# Patient Record
Sex: FEMALE | Race: BLACK OR AFRICAN AMERICAN
[De-identification: names, ages, dates, MRNs, and addresses within clinical notes are randomized per-mention and may not be internally consistent; named-entity substitution may affect disease eponyms.]

---

## 2018-11-05 ENCOUNTER — HOSPITAL ENCOUNTER (EMERGENCY)
Dept: HOSPITAL 62 - ER | Age: 58
Discharge: HOME | End: 2018-11-05
Payer: COMMERCIAL

## 2018-11-05 VITALS — SYSTOLIC BLOOD PRESSURE: 112 MMHG | DIASTOLIC BLOOD PRESSURE: 60 MMHG

## 2018-11-05 DIAGNOSIS — R47.81: ICD-10-CM

## 2018-11-05 DIAGNOSIS — E10.65: Primary | ICD-10-CM

## 2018-11-05 DIAGNOSIS — R41.82: ICD-10-CM

## 2018-11-05 DIAGNOSIS — M62.81: ICD-10-CM

## 2018-11-05 LAB
ADD MANUAL DIFF: NO
ALBUMIN SERPL-MCNC: 4.3 G/DL (ref 3.5–5)
ALP SERPL-CCNC: 78 U/L (ref 38–126)
ALT SERPL-CCNC: 22 U/L (ref 9–52)
ANION GAP SERPL CALC-SCNC: 11 MMOL/L (ref 5–19)
APPEARANCE UR: (no result)
APTT PPP: (no result) S
AST SERPL-CCNC: 31 U/L (ref 14–36)
BASOPHILS # BLD AUTO: 0 10^3/UL (ref 0–0.2)
BASOPHILS NFR BLD AUTO: 0.5 % (ref 0–2)
BILIRUB DIRECT SERPL-MCNC: 0.1 MG/DL (ref 0–0.4)
BILIRUB SERPL-MCNC: 1.2 MG/DL (ref 0.2–1.3)
BILIRUB UR QL STRIP: NEGATIVE
BUN SERPL-MCNC: 9 MG/DL (ref 7–20)
CALCIUM: 9.9 MG/DL (ref 8.4–10.2)
CHLORIDE SERPL-SCNC: 104 MMOL/L (ref 98–107)
CO2 SERPL-SCNC: 29 MMOL/L (ref 22–30)
EOSINOPHIL # BLD AUTO: 0 10^3/UL (ref 0–0.6)
EOSINOPHIL NFR BLD AUTO: 0.6 % (ref 0–6)
ERYTHROCYTE [DISTWIDTH] IN BLOOD BY AUTOMATED COUNT: 13.5 % (ref 11.5–14)
GLUCOSE SERPL-MCNC: 108 MG/DL (ref 75–110)
GLUCOSE UR STRIP-MCNC: NEGATIVE MG/DL
HCT VFR BLD CALC: 37.2 % (ref 36–47)
HGB BLD-MCNC: 13 G/DL (ref 12–15.5)
KETONES UR STRIP-MCNC: NEGATIVE MG/DL
LYMPHOCYTES # BLD AUTO: 1.5 10^3/UL (ref 0.5–4.7)
LYMPHOCYTES NFR BLD AUTO: 27.3 % (ref 13–45)
MCH RBC QN AUTO: 33.2 PG (ref 27–33.4)
MCHC RBC AUTO-ENTMCNC: 35 G/DL (ref 32–36)
MCV RBC AUTO: 95 FL (ref 80–97)
MONOCYTES # BLD AUTO: 0.3 10^3/UL (ref 0.1–1.4)
MONOCYTES NFR BLD AUTO: 5.9 % (ref 3–13)
NEUTROPHILS # BLD AUTO: 3.6 10^3/UL (ref 1.7–8.2)
NEUTS SEG NFR BLD AUTO: 65.7 % (ref 42–78)
NITRITE UR QL STRIP: NEGATIVE
PH UR STRIP: 7 [PH] (ref 5–9)
PLATELET # BLD: 201 10^3/UL (ref 150–450)
POTASSIUM SERPL-SCNC: 4.6 MMOL/L (ref 3.6–5)
PROT SERPL-MCNC: 7.3 G/DL (ref 6.3–8.2)
PROT UR STRIP-MCNC: NEGATIVE MG/DL
RBC # BLD AUTO: 3.91 10^6/UL (ref 3.72–5.28)
SODIUM SERPL-SCNC: 143.9 MMOL/L (ref 137–145)
SP GR UR STRIP: 1
TOTAL CELLS COUNTED % (AUTO): 100 %
UROBILINOGEN UR-MCNC: NEGATIVE MG/DL (ref ?–2)
WBC # BLD AUTO: 5.5 10^3/UL (ref 4–10.5)

## 2018-11-05 PROCEDURE — 80053 COMPREHEN METABOLIC PANEL: CPT

## 2018-11-05 PROCEDURE — 81001 URINALYSIS AUTO W/SCOPE: CPT

## 2018-11-05 PROCEDURE — 84484 ASSAY OF TROPONIN QUANT: CPT

## 2018-11-05 PROCEDURE — 93005 ELECTROCARDIOGRAM TRACING: CPT

## 2018-11-05 PROCEDURE — 99284 EMERGENCY DEPT VISIT MOD MDM: CPT

## 2018-11-05 PROCEDURE — 85025 COMPLETE CBC W/AUTO DIFF WBC: CPT

## 2018-11-05 PROCEDURE — 36415 COLL VENOUS BLD VENIPUNCTURE: CPT

## 2018-11-05 PROCEDURE — 82962 GLUCOSE BLOOD TEST: CPT

## 2018-11-05 PROCEDURE — 93010 ELECTROCARDIOGRAM REPORT: CPT

## 2018-11-05 NOTE — ER DOCUMENT REPORT
ED Blood Sugar Problem





- General


Mode of Arrival: Ambulatory


Information source: Patient, Relative


TRAVEL OUTSIDE OF THE [place] IN LAST 30 DAYS: No





<REYES GARCIA - Last Filed: 11/05/18 12:55>





<FATEMEH RANDOLPH - Last Filed: 11/05/18 14:05>





- General


Chief Complaint: Low Blood Sugar


Stated Complaint: WEAKNESS,SLURRED SPEECH


Time Seen by Provider: 11/05/18 11:15


Notes: 





57-year-old female that presents to the emergency department today with 

complaints of waking up with slurred speech, right upper and right lower 

extremity weakness.  Patient states this is happened to her in the past when 

she had low blood sugars.  Family states they gave the patient glucose just 

prior to coming here. Patient was found to have a blood sugar of 77 here on 

arrival.  After getting this glucose, patient states her symptoms completely 

resolved shortly after arriving here. (REYES GARCIA)





- Related Data


Allergies/Adverse Reactions: 


 





No Known Allergies Allergy (Verified 11/05/18 11:04)


 











Past Medical History





- General


Information source: Patient





- Social History


Smoking Status: Never Smoker


Frequency of alcohol use: None


Drug Abuse: None


Lives with: Spouse/Significant other


Family History: Reviewed & Not Pertinent


Patient has suicidal ideation: No


Patient has homicidal ideation: No


Endocrine Medical History: Reports: Hx Diabetes Mellitus Type 1


Past Surgical History: Reports: Hx Hysterectomy





<REYES GARCIA - Last Filed: 11/05/18 12:55>





Review of Systems





- Review of Systems


Constitutional: See HPI, Other - Low BGL


EENT: No symptoms reported


Cardiovascular: No symptoms reported


Respiratory: No symptoms reported


Gastrointestinal: No symptoms reported


Genitourinary: No symptoms reported


Female Genitourinary: No symptoms reported


Musculoskeletal: No symptoms reported


Skin: No symptoms reported


Hematologic/Lymphatic: No symptoms reported


Neurological/Psychological: See HPI, Weakness - RUE/RLE, resolved, Speech 

impairment - resolved


-: Yes All other systems reviewed and negative





<REYES GARCIA - Last Filed: 11/05/18 12:55>





Physical Exam





<REYES GARCIA - Last Filed: 11/05/18 12:55>





<FATEMEH RANDOLPH - Last Filed: 11/05/18 14:05>





- Vital signs


Vitals: 


 











Temp Pulse Resp BP Pulse Ox


 


 98.2 F   71   16   118/54 L  99 


 


 11/05/18 11:06  11/05/18 11:06  11/05/18 11:06  11/05/18 11:06  11/05/18 11:06














- Notes


Notes: 





Physical Exam:


 


General: Alert, appears well. 


 


HEENT: Normocephalic. Atraumatic. PERRL. Extraocular movements intact. 

Oropharynx clear.


 


Neck: Supple. Non-tender.


 


Respiratory: No respiratory distress. Clear and equal breath sounds bilaterally.


 


Cardiovascular: Regular rate and rhythm. 


 


Abdominal: Normal Inspection. Non-tender. No distension. Normal Bowel Sounds. 


 


Back: Non-tender. No deformity or step off.


 


Extremities: Moves all four extremities.


Upper extremities: Normal inspection. Normal ROM.  


Lower extremities: Normal inspection. No edema. Normal ROM.


 


Neurological: Normal cognition. AAOx4. Normal speech.  


 


Psychological: Normal affect. Normal Mood. 


 


Skin: Warm. Dry. Normal color. (REYES GARCIA)





Course





- Laboratory


Result Diagrams: 


 11/05/18 11:35





 11/05/18 11:35





<REYES GARCIA - Last Filed: 11/05/18 12:55>





- Laboratory


Result Diagrams: 


 11/05/18 11:35





 11/05/18 11:35





<FATEMEH RANDOLPH - Last Filed: 11/05/18 14:05>





- Vital Signs


Vital signs: 


 











Temp Pulse Resp BP Pulse Ox


 


 98.2 F   71   16   118/54 L  100 


 


 11/05/18 11:06  11/05/18 11:06  11/05/18 11:06  11/05/18 11:06  11/05/18 11:42














- Laboratory


Laboratory results interpreted by me: 


 











  11/05/18





  13:00


 


Ur Leukocyte Esterase  SMALL H














Discharge





<REYES GARCIA - Last Filed: 11/05/18 12:55>





<FATEMEH RANDOLPH - Last Filed: 11/05/18 14:05>





- Discharge


Clinical Impression: 


 Hypoglycemia





Altered mental status


Qualifiers:


 Altered mental status type: unspecified Qualified Code(s): R41.82 - Altered 

mental status, unspecified





Condition: Stable


Disposition: HOME, SELF-CARE


Additional Instructions: 


Hypoglycemia





     You have suffered an episode of hypoglycemia (low blood sugar). Typical 

symptoms of hypoglycemia are shaking, sweating, headache, and confusion.  When 

severe, unconsciousness or seizure may occur. Hypoglycemia occurs when a person 

taking insulin or diabetes pills has a change in the amount of blood sugar 

available -- due to exercise, decreased food intake, or alcohol.


     Should you feel symptoms of hypoglycemia again, immediately take some form 

of sugar such as sweetened juice.  As the reaction subsides, eat a complex 

carbohydrate such as bread.  If possible, check your blood sugar using a 

chemical strip.


     If episodes are occurring without obvious explanation, contact your 

physician for further evaluation.








********************************************************************************

*************





Be sure to check your sugars frequently.


Reduce your insulin dosing if your blood sugars are running low.


Be sure not to miss any meals.


Follow-up with your doctor tomorrow to discuss changes to your insulin dosing.





RETURN TO THE EMERGENCY ROOM IF ANY NEW OR WORSENING SYMPTOMS.








Scribe Attestation: 





11/05/18 12:19


I personally performed the services described in the documentation, reviewed 

and edited the documentation which was dictated to the scribe in my presence, 

and it accurately records my words and actions. (FATEMEH RANDOLPH)

## 2018-11-05 NOTE — EKG REPORT
SEVERITY:- OTHERWISE NORMAL ECG -

SINUS RHYTHM

LEFT AXIS DEVIATION

LOW VOLTAGE IN FRONTAL LEADS

:

Confirmed by: Jabier Charles MD 05-Nov-2018 13:18:49

## 2018-11-05 NOTE — ER DOCUMENT REPORT
ED Medical Screen (RME)





- General


Chief Complaint: Low Blood Sugar


Stated Complaint: WEAKNESS,SLURRED SPEECH


Time Seen by Provider: 11/05/18 11:15


Mode of Arrival: Ambulatory


Information source: Patient


Notes: 





57-year-old female presents emergency department complaints of slurred speech, 

right upper extremity, right lower extremity weakness.  Patient states that she 

woke up with the symptoms.  Patient has a history of diabetes and CVA.  She 

states that she has been checking her blood sugar and it has been running on 

the lower side.  In the emergency department her blood sugar 77.  Patient 

states that she does have a history of CVA with similar symptoms in the past.  

She states that her hypoglycemic episodes can also mimic her stroke symptoms.  

Patient states that her symptoms are not resolving and she is concerned she is 

having another stroke.





I have greeted and performed a rapid initial assessment of this patient.  A 

comprehensive ED assessment and evaluation of the patient, analysis of test 

results and completion of the medical decision making process will be conducted 

by additional ED providers.





PHYSICAL EXAMINATION:





GENERAL: Well-appearing, well-nourished and in no acute distress.





HEAD: Atraumatic, normocephalic.





EYES: Pupils equal round extraocular movements intact,  conjunctiva are normal.





ENT: Nares patent





NECK: Normal range of motion





LUNGS: No respiratory distress





Musculoskeletal: Normal range of motion





NEUROLOGICAL:  Normal speech, Normal finger to nose. No facial droop. No 

weakness appreciated. 





PSYCH: Normal mood, normal affect.





SKIN: Warm, Dry, normal turgor, no rashes or lesions noted.


TRAVEL OUTSIDE OF THE [place] IN LAST 30 DAYS: No





- Related Data


Allergies/Adverse Reactions: 


 





No Known Allergies Allergy (Verified 11/05/18 11:04)


 











Doctor's Discharge





- Discharge


Referrals: 


[facility],NO [Primary Care Provider] - Follow up as needed

## 2019-10-02 ENCOUNTER — HOSPITAL ENCOUNTER (OUTPATIENT)
Dept: HOSPITAL 62 - SC | Age: 59
Discharge: HOME | End: 2019-10-02
Attending: OPHTHALMOLOGY
Payer: OTHER GOVERNMENT

## 2019-10-02 DIAGNOSIS — E78.00: ICD-10-CM

## 2019-10-02 DIAGNOSIS — H25.11: Primary | ICD-10-CM

## 2019-10-02 DIAGNOSIS — E11.9: ICD-10-CM

## 2019-10-02 DIAGNOSIS — Z79.4: ICD-10-CM

## 2019-10-02 DIAGNOSIS — J45.909: ICD-10-CM

## 2019-10-02 PROCEDURE — 82962 GLUCOSE BLOOD TEST: CPT

## 2019-10-02 PROCEDURE — 66984 XCAPSL CTRC RMVL W/O ECP: CPT

## 2019-10-02 PROCEDURE — V2632 POST CHMBR INTRAOCULAR LENS: HCPCS

## 2019-10-02 PROCEDURE — 00142 ANES PX ON EYE LENS SURGERY: CPT

## 2019-10-02 RX ADMIN — BESIFLOXACIN PRN DROP: 6 SUSPENSION OPHTHALMIC at 12:50

## 2019-10-02 RX ADMIN — EPINEPHRINE ONE MG: 1 INJECTION, SOLUTION, CONCENTRATE INTRAVENOUS at 13:09

## 2019-10-02 RX ADMIN — DORZOLAMIDE HYDROCHLORIDE AND TIMOLOL MALEATE PRN DROP: 20; 5 SOLUTION OPHTHALMIC at 00:00

## 2019-10-02 RX ADMIN — BESIFLOXACIN PRN DROP: 6 SUSPENSION OPHTHALMIC at 12:40

## 2019-10-02 RX ADMIN — CYCLOPENTOLATE HYDROCHLORIDE AND PHENYLEPHRINE HYDROCHLORIDE PRN DROP: 2; 10 SOLUTION/ DROPS OPHTHALMIC at 12:57

## 2019-10-02 RX ADMIN — SODIUM CHONDROITIN SULFATE / SODIUM HYALURONATE ONE EACH: 0.55-0.5 INJECTION INTRAOCULAR at 00:00

## 2019-10-02 RX ADMIN — HEPARIN SODIUM ONE ML: 1000 INJECTION, SOLUTION INTRAVENOUS; SUBCUTANEOUS at 00:00

## 2019-10-02 RX ADMIN — TROPICAMIDE PRN DROP: 10 SOLUTION/ DROPS OPHTHALMIC at 12:57

## 2019-10-02 RX ADMIN — CYCLOPENTOLATE HYDROCHLORIDE AND PHENYLEPHRINE HYDROCHLORIDE PRN DROP: 2; 10 SOLUTION/ DROPS OPHTHALMIC at 12:40

## 2019-10-02 RX ADMIN — TETRACAINE HYDROCHLORIDE PRN DROP: 5 SOLUTION OPHTHALMIC at 12:58

## 2019-10-02 RX ADMIN — TROPICAMIDE PRN DROP: 10 SOLUTION/ DROPS OPHTHALMIC at 12:50

## 2019-10-02 RX ADMIN — BESIFLOXACIN PRN DROP: 6 SUSPENSION OPHTHALMIC at 13:17

## 2019-10-02 RX ADMIN — BESIFLOXACIN PRN DROP: 6 SUSPENSION OPHTHALMIC at 00:00

## 2019-10-02 RX ADMIN — TOBRAMYCIN AND DEXAMETHASONE ONE APPLIC: 3; 1 OINTMENT OPHTHALMIC at 13:17

## 2019-10-02 RX ADMIN — CYCLOPENTOLATE HYDROCHLORIDE AND PHENYLEPHRINE HYDROCHLORIDE PRN DROP: 2; 10 SOLUTION/ DROPS OPHTHALMIC at 12:50

## 2019-10-02 RX ADMIN — HEPARIN SODIUM ONE ML: 1000 INJECTION, SOLUTION INTRAVENOUS; SUBCUTANEOUS at 13:09

## 2019-10-02 RX ADMIN — TROPICAMIDE PRN DROP: 10 SOLUTION/ DROPS OPHTHALMIC at 12:40

## 2019-10-02 RX ADMIN — EPINEPHRINE ONE MG: 1 INJECTION, SOLUTION, CONCENTRATE INTRAVENOUS at 00:00

## 2019-10-02 RX ADMIN — DORZOLAMIDE HYDROCHLORIDE AND TIMOLOL MALEATE PRN DROP: 20; 5 SOLUTION OPHTHALMIC at 13:17

## 2019-10-02 RX ADMIN — TETRACAINE HYDROCHLORIDE PRN DROP: 5 SOLUTION OPHTHALMIC at 12:57

## 2019-10-02 RX ADMIN — TETRACAINE HYDROCHLORIDE PRN DROP: 5 SOLUTION OPHTHALMIC at 12:40

## 2019-10-02 RX ADMIN — SODIUM CHONDROITIN SULFATE / SODIUM HYALURONATE ONE EACH: 0.55-0.5 INJECTION INTRAOCULAR at 13:09

## 2019-10-02 RX ADMIN — TOBRAMYCIN AND DEXAMETHASONE ONE APPLIC: 3; 1 OINTMENT OPHTHALMIC at 00:00

## 2019-10-15 ENCOUNTER — HOSPITAL ENCOUNTER (OUTPATIENT)
Dept: HOSPITAL 62 - OD | Age: 59
End: 2019-10-15
Attending: PHYSICIAN ASSISTANT
Payer: OTHER GOVERNMENT

## 2019-10-15 DIAGNOSIS — E10.9: ICD-10-CM

## 2019-10-15 DIAGNOSIS — Z79.899: ICD-10-CM

## 2019-10-15 DIAGNOSIS — E78.5: ICD-10-CM

## 2019-10-15 DIAGNOSIS — I49.1: Primary | ICD-10-CM

## 2019-10-15 LAB
ALBUMIN SERPL-MCNC: 4.1 G/DL (ref 3.5–5)
ALP SERPL-CCNC: 92 U/L (ref 38–126)
ANION GAP SERPL CALC-SCNC: 9 MMOL/L (ref 5–19)
AST SERPL-CCNC: 34 U/L (ref 14–36)
BILIRUB DIRECT SERPL-MCNC: 0.2 MG/DL (ref 0–0.4)
BILIRUB SERPL-MCNC: 1.2 MG/DL (ref 0.2–1.3)
BUN SERPL-MCNC: 10 MG/DL (ref 7–20)
CALCIUM: 9.5 MG/DL (ref 8.4–10.2)
CHLORIDE SERPL-SCNC: 104 MMOL/L (ref 98–107)
CHOLEST SERPL-MCNC: 158.35 MG/DL (ref 0–200)
CO2 SERPL-SCNC: 28 MMOL/L (ref 22–30)
ERYTHROCYTE [DISTWIDTH] IN BLOOD BY AUTOMATED COUNT: 13.2 % (ref 11.5–14)
GLUCOSE SERPL-MCNC: 93 MG/DL (ref 75–110)
HCT VFR BLD CALC: 36 % (ref 36–47)
HGB BLD-MCNC: 12.4 G/DL (ref 12–15.5)
LDLC SERPL DIRECT ASSAY-MCNC: 97 MG/DL (ref ?–100)
MCH RBC QN AUTO: 32.7 PG (ref 27–33.4)
MCHC RBC AUTO-ENTMCNC: 34.6 G/DL (ref 32–36)
MCV RBC AUTO: 95 FL (ref 80–97)
PLATELET # BLD: 178 10^3/UL (ref 150–450)
POTASSIUM SERPL-SCNC: 5 MMOL/L (ref 3.6–5)
PROT SERPL-MCNC: 7.2 G/DL (ref 6.3–8.2)
RBC # BLD AUTO: 3.8 10^6/UL (ref 3.72–5.28)
TRIGL SERPL-MCNC: 49 MG/DL (ref ?–150)
VLDLC SERPL CALC-MCNC: 10 MG/DL (ref 10–31)
WBC # BLD AUTO: 3.9 10^3/UL (ref 4–10.5)

## 2019-10-15 PROCEDURE — 84443 ASSAY THYROID STIM HORMONE: CPT

## 2019-10-15 PROCEDURE — 83735 ASSAY OF MAGNESIUM: CPT

## 2019-10-15 PROCEDURE — 80076 HEPATIC FUNCTION PANEL: CPT

## 2019-10-15 PROCEDURE — 80048 BASIC METABOLIC PNL TOTAL CA: CPT

## 2019-10-15 PROCEDURE — 80061 LIPID PANEL: CPT

## 2019-10-15 PROCEDURE — 36415 COLL VENOUS BLD VENIPUNCTURE: CPT

## 2019-10-15 PROCEDURE — 85027 COMPLETE CBC AUTOMATED: CPT

## 2019-10-16 ENCOUNTER — HOSPITAL ENCOUNTER (OUTPATIENT)
Dept: HOSPITAL 62 - SC | Age: 59
Discharge: HOME | End: 2019-10-16
Attending: OPHTHALMOLOGY
Payer: OTHER GOVERNMENT

## 2019-10-16 DIAGNOSIS — Z79.4: ICD-10-CM

## 2019-10-16 DIAGNOSIS — E11.9: ICD-10-CM

## 2019-10-16 DIAGNOSIS — H25.12: Primary | ICD-10-CM

## 2019-10-16 DIAGNOSIS — Z98.41: ICD-10-CM

## 2019-10-16 PROCEDURE — 66982 XCAPSL CTRC RMVL CPLX WO ECP: CPT

## 2019-10-16 PROCEDURE — V2632 POST CHMBR INTRAOCULAR LENS: HCPCS

## 2019-10-16 PROCEDURE — 00142 ANES PX ON EYE LENS SURGERY: CPT

## 2019-10-16 PROCEDURE — 82962 GLUCOSE BLOOD TEST: CPT

## 2019-10-16 RX ADMIN — TROPICAMIDE PRN DROP: 10 SOLUTION/ DROPS OPHTHALMIC at 08:00

## 2019-10-16 RX ADMIN — TETRACAINE HYDROCHLORIDE PRN DROP: 5 SOLUTION OPHTHALMIC at 07:32

## 2019-10-16 RX ADMIN — TROPICAMIDE PRN DROP: 10 SOLUTION/ DROPS OPHTHALMIC at 08:10

## 2019-10-16 RX ADMIN — CYCLOPENTOLATE HYDROCHLORIDE AND PHENYLEPHRINE HYDROCHLORIDE PRN DROP: 2; 10 SOLUTION/ DROPS OPHTHALMIC at 08:22

## 2019-10-16 RX ADMIN — SODIUM CHONDROITIN SULFATE / SODIUM HYALURONATE ONE EACH: 0.55-0.5 INJECTION INTRAOCULAR at 00:00

## 2019-10-16 RX ADMIN — TOBRAMYCIN AND DEXAMETHASONE ONE APPLIC: 3; 1 OINTMENT OPHTHALMIC at 00:00

## 2019-10-16 RX ADMIN — EPINEPHRINE ONE MG: 1 INJECTION, SOLUTION, CONCENTRATE INTRAVENOUS at 08:45

## 2019-10-16 RX ADMIN — HEPARIN SODIUM ONE ML: 1000 INJECTION, SOLUTION INTRAVENOUS; SUBCUTANEOUS at 08:45

## 2019-10-16 RX ADMIN — TETRACAINE HYDROCHLORIDE PRN DROP: 5 SOLUTION OPHTHALMIC at 08:32

## 2019-10-16 RX ADMIN — SODIUM CHONDROITIN SULFATE / SODIUM HYALURONATE ONE EACH: 0.55-0.5 INJECTION INTRAOCULAR at 08:45

## 2019-10-16 RX ADMIN — HEPARIN SODIUM ONE ML: 1000 INJECTION, SOLUTION INTRAVENOUS; SUBCUTANEOUS at 00:00

## 2019-10-16 RX ADMIN — BESIFLOXACIN PRN DROP: 6 SUSPENSION OPHTHALMIC at 08:55

## 2019-10-16 RX ADMIN — TETRACAINE HYDROCHLORIDE PRN DROP: 5 SOLUTION OPHTHALMIC at 08:23

## 2019-10-16 RX ADMIN — EPINEPHRINE ONE MG: 1 INJECTION, SOLUTION, CONCENTRATE INTRAVENOUS at 00:00

## 2019-10-16 RX ADMIN — BESIFLOXACIN PRN DROP: 6 SUSPENSION OPHTHALMIC at 00:00

## 2019-10-16 RX ADMIN — TOBRAMYCIN AND DEXAMETHASONE ONE APPLIC: 3; 1 OINTMENT OPHTHALMIC at 08:55

## 2019-10-16 RX ADMIN — CYCLOPENTOLATE HYDROCHLORIDE AND PHENYLEPHRINE HYDROCHLORIDE PRN DROP: 2; 10 SOLUTION/ DROPS OPHTHALMIC at 08:10

## 2019-10-16 RX ADMIN — BESIFLOXACIN PRN DROP: 6 SUSPENSION OPHTHALMIC at 08:10

## 2019-10-16 RX ADMIN — DORZOLAMIDE HYDROCHLORIDE AND TIMOLOL MALEATE PRN DROP: 20; 5 SOLUTION OPHTHALMIC at 08:55

## 2019-10-16 RX ADMIN — DORZOLAMIDE HYDROCHLORIDE AND TIMOLOL MALEATE PRN DROP: 20; 5 SOLUTION OPHTHALMIC at 00:00

## 2019-10-16 RX ADMIN — CYCLOPENTOLATE HYDROCHLORIDE AND PHENYLEPHRINE HYDROCHLORIDE PRN DROP: 2; 10 SOLUTION/ DROPS OPHTHALMIC at 08:00

## 2019-10-16 RX ADMIN — BESIFLOXACIN PRN DROP: 6 SUSPENSION OPHTHALMIC at 08:00

## 2019-10-16 RX ADMIN — TETRACAINE HYDROCHLORIDE PRN DROP: 5 SOLUTION OPHTHALMIC at 08:00

## 2019-10-16 RX ADMIN — TROPICAMIDE PRN DROP: 10 SOLUTION/ DROPS OPHTHALMIC at 08:22
